# Patient Record
Sex: MALE | Race: WHITE | NOT HISPANIC OR LATINO
[De-identification: names, ages, dates, MRNs, and addresses within clinical notes are randomized per-mention and may not be internally consistent; named-entity substitution may affect disease eponyms.]

---

## 2019-12-31 ENCOUNTER — APPOINTMENT (OUTPATIENT)
Dept: GASTROENTEROLOGY | Facility: CLINIC | Age: 59
End: 2019-12-31
Payer: COMMERCIAL

## 2019-12-31 VITALS
WEIGHT: 200 LBS | SYSTOLIC BLOOD PRESSURE: 138 MMHG | HEART RATE: 66 BPM | HEIGHT: 71 IN | DIASTOLIC BLOOD PRESSURE: 86 MMHG | BODY MASS INDEX: 28 KG/M2

## 2019-12-31 DIAGNOSIS — F10.10 ALCOHOL ABUSE, UNCOMPLICATED: ICD-10-CM

## 2019-12-31 DIAGNOSIS — Z12.11 ENCOUNTER FOR SCREENING FOR MALIGNANT NEOPLASM OF COLON: ICD-10-CM

## 2019-12-31 PROBLEM — Z00.00 ENCOUNTER FOR PREVENTIVE HEALTH EXAMINATION: Status: ACTIVE | Noted: 2019-12-31

## 2019-12-31 PROCEDURE — 99205 OFFICE O/P NEW HI 60 MIN: CPT

## 2019-12-31 RX ORDER — ICOSAPENT ETHYL 500 MG/1
0.5 CAPSULE ORAL
Refills: 0 | Status: ACTIVE | COMMUNITY

## 2019-12-31 RX ORDER — ATORVASTATIN CALCIUM 80 MG/1
80 TABLET, FILM COATED ORAL
Refills: 0 | Status: ACTIVE | COMMUNITY

## 2019-12-31 RX ORDER — TICAGRELOR 60 MG/1
60 TABLET ORAL
Refills: 0 | Status: ACTIVE | COMMUNITY

## 2019-12-31 RX ORDER — SODIUM SULFATE, POTASSIUM SULFATE, MAGNESIUM SULFATE 17.5; 3.13; 1.6 G/ML; G/ML; G/ML
17.5-3.13-1.6 SOLUTION, CONCENTRATE ORAL
Qty: 1 | Refills: 0 | Status: ACTIVE | COMMUNITY
Start: 2019-12-31 | End: 1900-01-01

## 2019-12-31 RX ORDER — METOPROLOL SUCCINATE 25 MG/1
25 TABLET, EXTENDED RELEASE ORAL
Refills: 0 | Status: ACTIVE | COMMUNITY

## 2019-12-31 RX ORDER — VALSARTAN 80 MG/1
80 TABLET, COATED ORAL
Refills: 0 | Status: ACTIVE | COMMUNITY

## 2019-12-31 NOTE — HISTORY OF PRESENT ILLNESS
[FreeTextEntry1] : In 2009 the patient had a Schatzki ring fractured with an 18 [place] balloon dilator during endoscopy he continues to have mild discomfort with swallowing and has recently a common somewhat painful and even had some trouble with water he has a long history of alcohol abuse and recently had some alcohol despite having stopped several years ago during the holidays he denies melena takes a baby aspirin for coronary artery disease prophylaxis as he has had 3 cardiac stents he never had a colonoscopy has no family history of inflammatory bowel disease or colon cancer

## 2019-12-31 NOTE — ASSESSMENT
[FreeTextEntry1] : Patient with a history of a Schatzki ring causing dysphagia once again having episodes of odynophagia and dysphagia we'll schedule an upper endoscopy and advised to stop alcohol use #2 screening for colon cancer #3 alcohol abuse we'll get an abdominal sonogram and copies of his most recent blood work

## 2019-12-31 NOTE — PHYSICAL EXAM
[General Appearance - Alert] : alert [Sclera] : the sclera and conjunctiva were normal [General Appearance - In No Acute Distress] : in no acute distress [PERRL With Normal Accommodation] : pupils were equal in size, round, and reactive to light [Extraocular Movements] : extraocular movements were intact [Outer Ear] : the ears and nose were normal in appearance [Oropharynx] : the oropharynx was normal [Neck Appearance] : the appearance of the neck was normal [Neck Cervical Mass (___cm)] : no neck mass was observed [Jugular Venous Distention Increased] : there was no jugular-venous distention [Thyroid Diffuse Enlargement] : the thyroid was not enlarged [Thyroid Nodule] : there were no palpable thyroid nodules [Auscultation Breath Sounds / Voice Sounds] : lungs were clear to auscultation bilaterally [Heart Rate And Rhythm] : heart rate was normal and rhythm regular [Heart Sounds] : normal S1 and S2 [Heart Sounds Gallop] : no gallops [Murmurs] : no murmurs [Full Pulse] : the pedal pulses are present [Heart Sounds Pericardial Friction Rub] : no pericardial rub [Edema] : there was no peripheral edema [Bowel Sounds] : normal bowel sounds [Abdomen Soft] : soft [Abdomen Tenderness] : non-tender [Abdomen Mass (___ Cm)] : no abdominal mass palpated [No CVA Tenderness] : no ~M costovertebral angle tenderness [No Spinal Tenderness] : no spinal tenderness [Skin Color & Pigmentation] : normal skin color and pigmentation [Skin Turgor] : normal skin turgor [] : no rash [Oriented To Time, Place, And Person] : oriented to person, place, and time [Impaired Insight] : insight and judgment were intact [Affect] : the affect was normal

## 2020-01-06 ENCOUNTER — APPOINTMENT (OUTPATIENT)
Dept: GASTROENTEROLOGY | Facility: AMBULATORY MEDICAL SERVICES | Age: 60
End: 2020-01-06
Payer: COMMERCIAL

## 2020-01-06 ENCOUNTER — RESULT REVIEW (OUTPATIENT)
Age: 60
End: 2020-01-06

## 2020-01-06 PROCEDURE — 45380 COLONOSCOPY AND BIOPSY: CPT

## 2020-01-06 PROCEDURE — 43249 ESOPH EGD DILATION <30 MM: CPT

## 2020-01-07 ENCOUNTER — RX RENEWAL (OUTPATIENT)
Age: 60
End: 2020-01-07

## 2020-01-07 RX ORDER — OMEPRAZOLE MAGNESIUM 20 MG/1
20 TABLET, DELAYED RELEASE ORAL TWICE DAILY
Qty: 120 | Refills: 3 | Status: ACTIVE | COMMUNITY
Start: 2020-01-07 | End: 1900-01-01

## 2020-03-03 ENCOUNTER — APPOINTMENT (OUTPATIENT)
Dept: GASTROENTEROLOGY | Facility: CLINIC | Age: 60
End: 2020-03-03
Payer: COMMERCIAL

## 2020-03-03 VITALS
BODY MASS INDEX: 29.4 KG/M2 | DIASTOLIC BLOOD PRESSURE: 92 MMHG | SYSTOLIC BLOOD PRESSURE: 142 MMHG | HEART RATE: 72 BPM | HEIGHT: 71 IN | WEIGHT: 210 LBS

## 2020-03-03 DIAGNOSIS — K20.0 EOSINOPHILIC ESOPHAGITIS: ICD-10-CM

## 2020-03-03 DIAGNOSIS — R13.10 DYSPHAGIA, UNSPECIFIED: ICD-10-CM

## 2020-03-03 PROCEDURE — 99214 OFFICE O/P EST MOD 30 MIN: CPT

## 2020-03-03 RX ORDER — OMEPRAZOLE 20 MG/1
20 CAPSULE, DELAYED RELEASE ORAL
Qty: 180 | Refills: 6 | Status: ACTIVE | COMMUNITY
Start: 2020-01-14 | End: 1900-01-01

## 2020-03-03 NOTE — ASSESSMENT
[FreeTextEntry1] : #1 eosinophilic esophagitis. At this time asymptomatic on Prilosec. Will continue same medication. He is status post dilatation of his Schatzki ring and at this time is feeling well. #2 overweight. His crit packed amount of beer and continues to lose weight without difficulty. #3 cecal adenomatous polyp. He will be recalled for a colonoscopy in 3 years

## 2020-03-03 NOTE — PHYSICAL EXAM
[General Appearance - Alert] : alert [General Appearance - In No Acute Distress] : in no acute distress [Sclera] : the sclera and conjunctiva were normal [PERRL With Normal Accommodation] : pupils were equal in size, round, and reactive to light [Extraocular Movements] : extraocular movements were intact [Oropharynx] : the oropharynx was normal [Outer Ear] : the ears and nose were normal in appearance [Neck Appearance] : the appearance of the neck was normal [Neck Cervical Mass (___cm)] : no neck mass was observed [Thyroid Diffuse Enlargement] : the thyroid was not enlarged [Jugular Venous Distention Increased] : there was no jugular-venous distention [Thyroid Nodule] : there were no palpable thyroid nodules [Auscultation Breath Sounds / Voice Sounds] : lungs were clear to auscultation bilaterally [Heart Rate And Rhythm] : heart rate was normal and rhythm regular [Heart Sounds] : normal S1 and S2 [Heart Sounds Gallop] : no gallops [Murmurs] : no murmurs [Heart Sounds Pericardial Friction Rub] : no pericardial rub [Full Pulse] : the pedal pulses are present [Edema] : there was no peripheral edema [Abdomen Soft] : soft [Bowel Sounds] : normal bowel sounds [Abdomen Tenderness] : non-tender [Abdomen Mass (___ Cm)] : no abdominal mass palpated [No CVA Tenderness] : no ~M costovertebral angle tenderness [No Spinal Tenderness] : no spinal tenderness [Skin Color & Pigmentation] : normal skin color and pigmentation [Skin Turgor] : normal skin turgor [] : no rash [Oriented To Time, Place, And Person] : oriented to person, place, and time [Impaired Insight] : insight and judgment were intact [Affect] : the affect was normal

## 2020-03-03 NOTE — HISTORY OF PRESENT ILLNESS
[FreeTextEntry1] :  the patient had an endoscopy for dysphagia and a colonoscopy for screening. He was noted to have a Schatzki ring, which was dilated as well as biopsies suggesting mild eosinophilic esophagitis. He has been taking Prilosec daily since the procedure January 6 and has been feeling quite well. He is also cut back significantly on alcohol and has lost weight intentionally he denies melena. He denies any VENTURA of nonsteroidal anti-inflammatory drugs

## 2020-06-04 ENCOUNTER — APPOINTMENT (OUTPATIENT)
Dept: GASTROENTEROLOGY | Facility: CLINIC | Age: 60
End: 2020-06-04